# Patient Record
(demographics unavailable — no encounter records)

---

## 2025-04-28 NOTE — HISTORY OF PRESENT ILLNESS
[Y] : Patient is sexually active [N] : Patient denies prior pregnancies [Menarche Age: ____] : age at menarche was [unfilled] [No] : Patient does not have concerns regarding sex [Previously active] : previously active [Men] : men [PapSmeardate] : 03/02/19 [TextBox_31] : NEG [GonorrheaDate] : 03/02/19 [TextBox_63] : NEG [ChlamydiaDate] : 03/02/19 [TextBox_68] : NEG [LMPDate] : 04/14/25 [PGHxTotal] : 0 [FreeTextEntry1] : 04/14/25

## 2025-04-28 NOTE — PHYSICAL EXAM
[Chaperoned Physical Exam] : A chaperone was present in the examining room during all aspects of the physical examination. [MA] : MA [FreeTextEntry2] : Angela [Appropriately responsive] : appropriately responsive [Alert] : alert [No Acute Distress] : no acute distress [No Lymphadenopathy] : no lymphadenopathy [Soft] : soft [Non-tender] : non-tender [Non-distended] : non-distended [No HSM] : No HSM [No Lesions] : no lesions [No Mass] : no mass [Oriented x3] : oriented x3 [Examination Of The Breasts] : a normal appearance [No Masses] : no breast masses were palpable [Labia Majora] : normal [Labia Minora] : normal [Normal] : normal [Uterine Adnexae] : normal